# Patient Record
Sex: FEMALE | Race: BLACK OR AFRICAN AMERICAN | NOT HISPANIC OR LATINO | Employment: FULL TIME | URBAN - METROPOLITAN AREA
[De-identification: names, ages, dates, MRNs, and addresses within clinical notes are randomized per-mention and may not be internally consistent; named-entity substitution may affect disease eponyms.]

---

## 2023-05-16 RX ORDER — FLUTICASONE PROPIONATE AND SALMETEROL 500; 50 UG/1; UG/1
POWDER RESPIRATORY (INHALATION) 2 TIMES DAILY
COMMUNITY
Start: 2020-08-26 | End: 2023-05-19 | Stop reason: ALTCHOICE

## 2023-05-16 RX ORDER — FLUTICASONE PROPIONATE 110 UG/1
AEROSOL, METERED RESPIRATORY (INHALATION)
COMMUNITY
Start: 2018-06-11 | End: 2023-05-19 | Stop reason: ALTCHOICE

## 2023-05-16 RX ORDER — ALBUTEROL SULFATE 0.83 MG/ML
SOLUTION RESPIRATORY (INHALATION)
COMMUNITY
Start: 2020-03-18

## 2023-05-16 RX ORDER — ALBUTEROL SULFATE 90 UG/1
AEROSOL, METERED RESPIRATORY (INHALATION)
Qty: 18 G | Status: CANCELLED | OUTPATIENT
Start: 2023-05-16

## 2023-05-16 RX ORDER — ALBUTEROL SULFATE 90 UG/1
AEROSOL, METERED RESPIRATORY (INHALATION)
COMMUNITY
End: 2023-05-19 | Stop reason: SDUPTHER

## 2023-05-19 ENCOUNTER — OFFICE VISIT (OUTPATIENT)
Dept: PRIMARY CARE | Facility: CLINIC | Age: 33
End: 2023-05-19
Payer: COMMERCIAL

## 2023-05-19 ENCOUNTER — LAB (OUTPATIENT)
Dept: LAB | Facility: LAB | Age: 33
End: 2023-05-19
Payer: COMMERCIAL

## 2023-05-19 VITALS
TEMPERATURE: 97.5 F | HEIGHT: 63 IN | DIASTOLIC BLOOD PRESSURE: 78 MMHG | SYSTOLIC BLOOD PRESSURE: 142 MMHG | OXYGEN SATURATION: 96 % | HEART RATE: 79 BPM | WEIGHT: 196 LBS | BODY MASS INDEX: 34.73 KG/M2

## 2023-05-19 DIAGNOSIS — I10 ESSENTIAL HYPERTENSION: ICD-10-CM

## 2023-05-19 DIAGNOSIS — J45.20 MILD INTERMITTENT ASTHMA WITHOUT COMPLICATION (HHS-HCC): ICD-10-CM

## 2023-05-19 DIAGNOSIS — Z76.0 MEDICATION REFILL: Primary | ICD-10-CM

## 2023-05-19 DIAGNOSIS — Z76.0 MEDICATION REFILL: ICD-10-CM

## 2023-05-19 DIAGNOSIS — Z13.1 SCREENING FOR DIABETES MELLITUS (DM): ICD-10-CM

## 2023-05-19 PROBLEM — H52.203 ASTIGMATISM OF BOTH EYES: Status: ACTIVE | Noted: 2023-05-19

## 2023-05-19 PROBLEM — E66.3 OVERWEIGHT: Status: ACTIVE | Noted: 2023-05-19

## 2023-05-19 PROBLEM — F33.9 RECURRENT MAJOR DEPRESSIVE DISORDER (CMS-HCC): Chronic | Status: ACTIVE | Noted: 2021-05-25

## 2023-05-19 PROBLEM — R04.0 NOSEBLEED: Status: RESOLVED | Noted: 2023-05-19 | Resolved: 2023-05-19

## 2023-05-19 PROBLEM — J30.2 OTHER SEASONAL ALLERGIC RHINITIS: Status: ACTIVE | Noted: 2023-05-19

## 2023-05-19 PROBLEM — F32.A DEPRESSION: Status: ACTIVE | Noted: 2021-05-25

## 2023-05-19 PROBLEM — F12.90 MARIJUANA USE: Status: RESOLVED | Noted: 2023-05-19 | Resolved: 2023-05-19

## 2023-05-19 LAB
ALBUMIN (G/DL) IN SER/PLAS: 4 G/DL (ref 3.4–5)
ALBUMIN (MG/L) IN URINE: <7 MG/L
ALBUMIN/CREATININE (UG/MG) IN URINE: NORMAL UG/MG CRT (ref 0–30)
ANION GAP IN SER/PLAS: 10 MMOL/L (ref 10–20)
CALCIUM (MG/DL) IN SER/PLAS: 9.8 MG/DL (ref 8.6–10.6)
CARBON DIOXIDE, TOTAL (MMOL/L) IN SER/PLAS: 28 MMOL/L (ref 21–32)
CHLORIDE (MMOL/L) IN SER/PLAS: 104 MMOL/L (ref 98–107)
CREATININE (MG/DL) IN SER/PLAS: 0.76 MG/DL (ref 0.5–1.05)
CREATININE (MG/DL) IN URINE: 170 MG/DL (ref 20–320)
ESTIMATED AVERAGE GLUCOSE FOR HBA1C: 100 MG/DL
GFR FEMALE: >90 ML/MIN/1.73M2
GLUCOSE (MG/DL) IN SER/PLAS: 80 MG/DL (ref 74–99)
HEMOGLOBIN A1C/HEMOGLOBIN TOTAL IN BLOOD: 5.1 %
PHOSPHATE (MG/DL) IN SER/PLAS: 3.3 MG/DL (ref 2.5–4.9)
POTASSIUM (MMOL/L) IN SER/PLAS: 4.6 MMOL/L (ref 3.5–5.3)
SODIUM (MMOL/L) IN SER/PLAS: 137 MMOL/L (ref 136–145)
UREA NITROGEN (MG/DL) IN SER/PLAS: 14 MG/DL (ref 6–23)

## 2023-05-19 PROCEDURE — 82570 ASSAY OF URINE CREATININE: CPT

## 2023-05-19 PROCEDURE — 36415 COLL VENOUS BLD VENIPUNCTURE: CPT

## 2023-05-19 PROCEDURE — 3078F DIAST BP <80 MM HG: CPT | Performed by: STUDENT IN AN ORGANIZED HEALTH CARE EDUCATION/TRAINING PROGRAM

## 2023-05-19 PROCEDURE — 80069 RENAL FUNCTION PANEL: CPT

## 2023-05-19 PROCEDURE — 82043 UR ALBUMIN QUANTITATIVE: CPT

## 2023-05-19 PROCEDURE — 3077F SYST BP >= 140 MM HG: CPT | Performed by: STUDENT IN AN ORGANIZED HEALTH CARE EDUCATION/TRAINING PROGRAM

## 2023-05-19 PROCEDURE — 99214 OFFICE O/P EST MOD 30 MIN: CPT | Performed by: STUDENT IN AN ORGANIZED HEALTH CARE EDUCATION/TRAINING PROGRAM

## 2023-05-19 PROCEDURE — 83036 HEMOGLOBIN GLYCOSYLATED A1C: CPT

## 2023-05-19 PROCEDURE — 1036F TOBACCO NON-USER: CPT | Performed by: STUDENT IN AN ORGANIZED HEALTH CARE EDUCATION/TRAINING PROGRAM

## 2023-05-19 RX ORDER — MOMETASONE FUROATE AND FORMOTEROL FUMARATE DIHYDRATE 200; 5 UG/1; UG/1
2 AEROSOL RESPIRATORY (INHALATION) 2 TIMES DAILY
Qty: 13 G | Refills: 11 | Status: SHIPPED | OUTPATIENT
Start: 2023-05-19 | End: 2024-06-10

## 2023-05-19 RX ORDER — MONTELUKAST SODIUM 10 MG/1
10 TABLET ORAL DAILY
COMMUNITY
End: 2023-05-19 | Stop reason: SDUPTHER

## 2023-05-19 RX ORDER — ALBUTEROL SULFATE 90 UG/1
2 AEROSOL, METERED RESPIRATORY (INHALATION) EVERY 4 HOURS PRN
Qty: 18 G | Refills: 11 | Status: SHIPPED | OUTPATIENT
Start: 2023-05-19 | End: 2023-05-19 | Stop reason: SDUPTHER

## 2023-05-19 RX ORDER — ALBUTEROL SULFATE 90 UG/1
2 AEROSOL, METERED RESPIRATORY (INHALATION) EVERY 4 HOURS PRN
Qty: 18 G | Refills: 11 | Status: SHIPPED | OUTPATIENT
Start: 2023-05-19 | End: 2023-05-22

## 2023-05-19 RX ORDER — VALACYCLOVIR HYDROCHLORIDE 1 G/1
1000 TABLET, FILM COATED ORAL EVERY 12 HOURS
COMMUNITY

## 2023-05-19 RX ORDER — MOMETASONE FUROATE AND FORMOTEROL FUMARATE DIHYDRATE 200; 5 UG/1; UG/1
2 AEROSOL RESPIRATORY (INHALATION) 2 TIMES DAILY
COMMUNITY
End: 2023-05-19 | Stop reason: SDUPTHER

## 2023-05-19 RX ORDER — IPRATROPIUM BROMIDE AND ALBUTEROL SULFATE 2.5; .5 MG/3ML; MG/3ML
SOLUTION RESPIRATORY (INHALATION)
COMMUNITY
Start: 2020-07-15

## 2023-05-19 RX ORDER — MONTELUKAST SODIUM 10 MG/1
10 TABLET ORAL DAILY
Qty: 90 TABLET | Refills: 3 | Status: SHIPPED | OUTPATIENT
Start: 2023-05-19 | End: 2024-05-18

## 2023-05-19 RX ORDER — VALACYCLOVIR HYDROCHLORIDE 500 MG/1
1 TABLET, FILM COATED ORAL EVERY 24 HOURS
Qty: 30 TABLET | Refills: 45 | COMMUNITY
Start: 2020-03-18 | End: 2023-12-18

## 2023-05-19 RX ORDER — AMLODIPINE BESYLATE 5 MG/1
5 TABLET ORAL DAILY
Qty: 90 TABLET | Refills: 0 | Status: SHIPPED | OUTPATIENT
Start: 2023-05-19 | End: 2023-05-19

## 2023-05-19 ASSESSMENT — PAIN SCALES - GENERAL: PAINLEVEL: 0-NO PAIN

## 2023-05-19 NOTE — TELEPHONE ENCOUNTER
Pt pharmacy called stating that the pt inhaler that you sent over is not cover with her insurance so they just need the brand name Ventolin.

## 2023-05-19 NOTE — PROGRESS NOTES
"Subjective   Patient ID: Suma Lizama is a 32 y.o. female who presents for Med Refill.  #Asthma  - Diagnosed as a child  - Never been hospitalized or had an exacerbation  - Uses rescue inhaler with exercise  - At most will wake up 2x a month for SOB  - Requesting refills of medications  - Denies any CP, SOB, F/C, N/V, or recent illness    #HTN  - No diagnosis of HTN  - Not currently taking medication  - Asymptomatic today  - Denies any HA, back pain, dyspnea/orthopnea, N/V, LH/dizziness      Objective     /78 (BP Location: Right arm, Patient Position: Sitting)   Pulse 79   Temp 36.4 °C (97.5 °F) (Temporal)   Ht 1.6 m (5' 3\")   Wt 88.9 kg (196 lb)   SpO2 96%   BMI 34.72 kg/m²     Physical Exam  Vitals reviewed.   Eyes:      Extraocular Movements: Extraocular movements intact.   Cardiovascular:      Rate and Rhythm: Normal rate and regular rhythm.      Heart sounds: Normal heart sounds.   Pulmonary:      Effort: Pulmonary effort is normal. No respiratory distress.      Breath sounds: Normal breath sounds.   Abdominal:      General: Abdomen is flat. Bowel sounds are normal. There is no distension.      Palpations: Abdomen is soft.      Tenderness: There is no abdominal tenderness. There is no guarding.   Musculoskeletal:         General: Normal range of motion.   Skin:     General: Skin is warm and dry.   Neurological:      General: No focal deficit present.      Mental Status: She is alert and oriented to person, place, and time.       Assessment/Plan   Suma Lizama is a 32 y.o. female who presents for concerns below    Diagnoses and all orders for this visit:  Medication refill  -     Dulera 200-5 mcg/actuation inhaler; Inhale 2 puffs 2 times a day.  -     montelukast (Singulair) 10 mg tablet; Take 1 tablet (10 mg) by mouth once daily.  Mild intermittent asthma without complication  -     Dulera 200-5 mcg/actuation inhaler; Inhale 2 puffs 2 times a day.  -     montelukast (Singulair) 10 mg " tablet; Take 1 tablet (10 mg) by mouth once daily.  Essential hypertension  -     Albumin , Urine Random; Future  -     Renal Function Panel; Future  Screening for diabetes mellitus (DM)  -     Hemoglobin A1c; Future    #Mild diminished asthma  - Chronic, stable  - Medications refilled and sent to patient's pharmacy of choice    #Hypertension  - In office blood pressure elevated, reviewed other blood pressures which are also elevated  - Discussed with patient importance of starting medication, she would defer at this time and would like to pursue weight loss  - We will collect screening labs for further evaluation  - Patient agreeable to reevaluate in 1 month to decide whether to start medications    Patient seen and discussed with attending: Dr.Binder Pantear Soler MD  Family Medicine  PGY3

## 2023-05-19 NOTE — PROGRESS NOTES
I saw and evaluated the patient. I personally obtained the key and critical portions of the history and physical exam or was physically present for key and critical portions performed by the resident/fellow. I reviewed the resident/fellow's documentation and discussed the patient with the resident/fellow. I agree with the resident/fellow's medical decision making as documented in the note.    Shravan Fox MD

## 2023-05-22 ENCOUNTER — TELEPHONE (OUTPATIENT)
Dept: PRIMARY CARE | Facility: CLINIC | Age: 33
End: 2023-05-22
Payer: COMMERCIAL

## 2023-05-22 DIAGNOSIS — Z76.0 MEDICATION REFILL: ICD-10-CM

## 2023-05-22 DIAGNOSIS — J45.20 MILD INTERMITTENT ASTHMA WITHOUT COMPLICATION (HHS-HCC): ICD-10-CM

## 2023-05-22 RX ORDER — AMLODIPINE BESYLATE 5 MG/1
5 TABLET ORAL DAILY
COMMUNITY
Start: 2023-05-19

## 2023-05-22 RX ORDER — ALBUTEROL SULFATE 90 UG/1
2 AEROSOL, METERED RESPIRATORY (INHALATION) EVERY 4 HOURS PRN
Qty: 18 G | Refills: 11 | Status: SHIPPED | OUTPATIENT
Start: 2023-05-22 | End: 2023-06-02 | Stop reason: SDUPTHER

## 2023-05-22 NOTE — TELEPHONE ENCOUNTER
Patient called in stating they need a prescription change for the following prescriptions because her insurance does not cover these  - Albuterol  - Montelukast  - Dulera

## 2023-06-02 DIAGNOSIS — Z76.0 MEDICATION REFILL: ICD-10-CM

## 2023-06-02 DIAGNOSIS — J45.20 MILD INTERMITTENT ASTHMA WITHOUT COMPLICATION (HHS-HCC): ICD-10-CM

## 2023-06-02 RX ORDER — ALBUTEROL SULFATE 90 UG/1
2 AEROSOL, METERED RESPIRATORY (INHALATION) EVERY 4 HOURS PRN
Qty: 18 G | Refills: 0 | Status: SHIPPED | OUTPATIENT
Start: 2023-06-02 | End: 2024-06-10

## 2023-06-20 ENCOUNTER — APPOINTMENT (OUTPATIENT)
Dept: PRIMARY CARE | Facility: CLINIC | Age: 33
End: 2023-06-20
Payer: COMMERCIAL

## 2024-04-26 ENCOUNTER — APPOINTMENT (OUTPATIENT)
Dept: PRIMARY CARE | Facility: CLINIC | Age: 34
End: 2024-04-26
Payer: COMMERCIAL

## 2024-05-28 ENCOUNTER — APPOINTMENT (OUTPATIENT)
Dept: PRIMARY CARE | Facility: CLINIC | Age: 34
End: 2024-05-28
Payer: COMMERCIAL

## 2024-06-06 DIAGNOSIS — J45.20 MILD INTERMITTENT ASTHMA WITHOUT COMPLICATION (HHS-HCC): ICD-10-CM

## 2024-06-06 DIAGNOSIS — Z76.0 MEDICATION REFILL: ICD-10-CM

## 2024-06-10 RX ORDER — MOMETASONE FUROATE AND FORMOTEROL FUMARATE DIHYDRATE 200; 5 UG/1; UG/1
2 AEROSOL RESPIRATORY (INHALATION) 2 TIMES DAILY
Qty: 13 G | Refills: 0 | Status: SHIPPED | OUTPATIENT
Start: 2024-06-10 | End: 2024-06-12 | Stop reason: SDUPTHER

## 2024-06-10 RX ORDER — ALBUTEROL SULFATE 90 UG/1
2 AEROSOL, METERED RESPIRATORY (INHALATION) EVERY 4 HOURS PRN
Qty: 18 G | Refills: 0 | Status: SHIPPED | OUTPATIENT
Start: 2024-06-10 | End: 2024-06-12 | Stop reason: SDUPTHER

## 2024-06-12 DIAGNOSIS — Z76.0 MEDICATION REFILL: ICD-10-CM

## 2024-06-12 DIAGNOSIS — J45.20 MILD INTERMITTENT ASTHMA WITHOUT COMPLICATION (HHS-HCC): ICD-10-CM

## 2024-06-12 RX ORDER — CHLORTHALIDONE 25 MG/1
25 TABLET ORAL DAILY
COMMUNITY
Start: 2024-03-08 | End: 2024-06-12 | Stop reason: SDUPTHER

## 2024-06-12 NOTE — TELEPHONE ENCOUNTER
[facility] called requesting refill of pt chlorthalidone 35mg, Dulera and albuterol inhaler. Noted pt not seen in over a year. Call placed to encourage scheduling. Appt scheduled to establish with Provider Rekha. Orders pended and routed for 30 day supply to last until appt for refills.

## 2024-06-13 RX ORDER — MOMETASONE FUROATE AND FORMOTEROL FUMARATE DIHYDRATE 200; 5 UG/1; UG/1
2 AEROSOL RESPIRATORY (INHALATION) 2 TIMES DAILY
Qty: 13 G | Refills: 0 | Status: SHIPPED | OUTPATIENT
Start: 2024-06-13

## 2024-06-13 RX ORDER — ALBUTEROL SULFATE 90 UG/1
2 AEROSOL, METERED RESPIRATORY (INHALATION) EVERY 4 HOURS PRN
Qty: 18 G | Refills: 0 | Status: SHIPPED | OUTPATIENT
Start: 2024-06-13

## 2024-06-13 RX ORDER — CHLORTHALIDONE 25 MG/1
25 TABLET ORAL DAILY
Qty: 30 TABLET | Refills: 0 | Status: SHIPPED | OUTPATIENT
Start: 2024-06-13 | End: 2024-07-13

## 2024-07-15 ENCOUNTER — APPOINTMENT (OUTPATIENT)
Dept: PRIMARY CARE | Facility: CLINIC | Age: 34
End: 2024-07-15
Payer: COMMERCIAL

## 2024-09-02 DIAGNOSIS — J45.20 MILD INTERMITTENT ASTHMA WITHOUT COMPLICATION (HHS-HCC): ICD-10-CM

## 2024-09-02 DIAGNOSIS — Z76.0 MEDICATION REFILL: ICD-10-CM

## 2024-09-03 RX ORDER — ALBUTEROL SULFATE 90 UG/1
AEROSOL, METERED RESPIRATORY (INHALATION)
Qty: 18 G | Refills: 0 | OUTPATIENT
Start: 2024-09-03

## 2024-09-03 RX ORDER — MOMETASONE FUROATE AND FORMOTEROL FUMARATE DIHYDRATE 200; 5 UG/1; UG/1
AEROSOL RESPIRATORY (INHALATION)
Qty: 13 G | Refills: 0 | OUTPATIENT
Start: 2024-09-03

## 2024-11-17 DIAGNOSIS — J45.20 MILD INTERMITTENT ASTHMA WITHOUT COMPLICATION (HHS-HCC): ICD-10-CM

## 2024-11-17 DIAGNOSIS — Z76.0 MEDICATION REFILL: ICD-10-CM

## 2024-11-25 RX ORDER — ALBUTEROL SULFATE 90 UG/1
AEROSOL, METERED RESPIRATORY (INHALATION)
Qty: 18 G | Refills: 0 | Status: SHIPPED | OUTPATIENT
Start: 2024-11-25

## 2024-11-25 NOTE — TELEPHONE ENCOUNTER
[facility] called requesting refill of pt ventolin inhaler. Noted pt not seen since May 2023,  and in need of transferring care d/t previous provider no longer in clinic. Call placed to encourage scheduling. Appt scheduled for Dec 9th @ 13:00 with Provider True. Pharmacy pended order request routed to provider.

## 2024-12-09 ENCOUNTER — APPOINTMENT (OUTPATIENT)
Dept: PRIMARY CARE | Facility: CLINIC | Age: 34
End: 2024-12-09
Payer: COMMERCIAL

## 2025-02-11 DIAGNOSIS — J45.20 MILD INTERMITTENT ASTHMA WITHOUT COMPLICATION (HHS-HCC): ICD-10-CM

## 2025-02-11 DIAGNOSIS — Z76.0 MEDICATION REFILL: ICD-10-CM

## 2025-02-17 RX ORDER — ALBUTEROL SULFATE 90 UG/1
AEROSOL, METERED RESPIRATORY (INHALATION)
Qty: 18 G | Refills: 0 | Status: SHIPPED | OUTPATIENT
Start: 2025-02-17

## 2025-02-20 ENCOUNTER — HOSPITAL ENCOUNTER (EMERGENCY)
Facility: HOSPITAL | Age: 35
Discharge: LEFT BEFORE DISCHARGE | End: 2025-02-20
Payer: COMMERCIAL

## 2025-02-20 PROCEDURE — 4500999001 HC ED NO CHARGE

## 2025-03-03 ENCOUNTER — APPOINTMENT (OUTPATIENT)
Dept: PRIMARY CARE | Facility: CLINIC | Age: 35
End: 2025-03-03
Payer: COMMERCIAL

## 2025-03-04 ENCOUNTER — OFFICE VISIT (OUTPATIENT)
Dept: PRIMARY CARE | Facility: CLINIC | Age: 35
End: 2025-03-04
Payer: COMMERCIAL

## 2025-03-04 VITALS
HEART RATE: 89 BPM | WEIGHT: 204 LBS | DIASTOLIC BLOOD PRESSURE: 69 MMHG | BODY MASS INDEX: 36.14 KG/M2 | OXYGEN SATURATION: 97 % | SYSTOLIC BLOOD PRESSURE: 139 MMHG | RESPIRATION RATE: 18 BRPM | TEMPERATURE: 97.3 F

## 2025-03-04 DIAGNOSIS — M54.2 NECK PAIN: Primary | ICD-10-CM

## 2025-03-04 PROBLEM — F32.A DEPRESSION: Status: RESOLVED | Noted: 2021-05-25 | Resolved: 2025-03-04

## 2025-03-04 PROCEDURE — 1036F TOBACCO NON-USER: CPT | Performed by: FAMILY MEDICINE

## 2025-03-04 PROCEDURE — 99214 OFFICE O/P EST MOD 30 MIN: CPT | Performed by: FAMILY MEDICINE

## 2025-03-04 RX ORDER — NAPROXEN 500 MG/1
500 TABLET ORAL 2 TIMES DAILY PRN
Qty: 60 TABLET | Refills: 0 | Status: SHIPPED | OUTPATIENT
Start: 2025-03-04 | End: 2025-06-02

## 2025-03-04 RX ORDER — DULOXETIN HYDROCHLORIDE 20 MG/1
20 CAPSULE, DELAYED RELEASE ORAL 2 TIMES DAILY
Qty: 60 CAPSULE | Refills: 0 | Status: SHIPPED | OUTPATIENT
Start: 2025-03-04 | End: 2025-04-03

## 2025-03-04 ASSESSMENT — PAIN SCALES - GENERAL: PAINLEVEL_OUTOF10: 9

## 2025-03-04 NOTE — PROGRESS NOTES
Subjective   Patient ID: Suma Lizama is a 34 y.o. female who presents for Numbness and Acute Visit.    HPI   Neck pain radiates down left hand.  At [facility], diagnosed with cervical radiculopathy : took Prednisone and Tramadol.  Tramadol helped with pain    Review of Systems   All other systems reviewed and are negative.      Objective   /69 (BP Location: Left arm, Patient Position: Sitting, BP Cuff Size: Adult)   Pulse 89   Temp 36.3 °C (97.3 °F) (Temporal)   Resp 18   Wt 92.5 kg (204 lb)   LMP 02/17/2025 (Approximate)   SpO2 97%   BMI 36.14 kg/m²     Physical Exam  Vitals and nursing note reviewed.   Cardiovascular:      Rate and Rhythm: Normal rate and regular rhythm.   Pulmonary:      Effort: Pulmonary effort is normal.      Breath sounds: Normal breath sounds.   Musculoskeletal:      Cervical back: Neck supple. Tenderness present. Decreased range of motion.   Lymphadenopathy:      Cervical: No cervical adenopathy.   Neurological:      Mental Status: She is alert.      Sensory: Sensation is intact.      Motor: Motor function is intact.         Assessment/Plan   Diagnoses and all orders for this visit:  Neck pain  -     naproxen (Naprosyn) 500 mg tablet; Take 1 tablet (500 mg) by mouth 2 times a day as needed for mild pain (1 - 3) (pain).  -     DULoxetine (Cymbalta) 20 mg DR capsule; Take 1 capsule (20 mg) by mouth 2 times a day. Do not crush or chew.  -     Referral to Pain Medicine; Future

## 2025-03-07 ENCOUNTER — OFFICE VISIT (OUTPATIENT)
Dept: PRIMARY CARE | Facility: CLINIC | Age: 35
End: 2025-03-07
Payer: COMMERCIAL

## 2025-03-07 VITALS
TEMPERATURE: 98.1 F | OXYGEN SATURATION: 97 % | SYSTOLIC BLOOD PRESSURE: 153 MMHG | DIASTOLIC BLOOD PRESSURE: 88 MMHG | WEIGHT: 192 LBS | RESPIRATION RATE: 18 BRPM | BODY MASS INDEX: 34.01 KG/M2 | HEART RATE: 86 BPM

## 2025-03-07 DIAGNOSIS — M54.2 NECK PAIN: Primary | ICD-10-CM

## 2025-03-07 DIAGNOSIS — J45.20 MILD INTERMITTENT ASTHMA WITHOUT COMPLICATION (HHS-HCC): ICD-10-CM

## 2025-03-07 PROCEDURE — 99214 OFFICE O/P EST MOD 30 MIN: CPT | Performed by: FAMILY MEDICINE

## 2025-03-07 PROCEDURE — 1036F TOBACCO NON-USER: CPT | Performed by: FAMILY MEDICINE

## 2025-03-07 RX ORDER — ALBUTEROL SULFATE 90 UG/1
2 AEROSOL, METERED RESPIRATORY (INHALATION) EVERY 6 HOURS PRN
Qty: 18 G | Refills: 3 | Status: SHIPPED | OUTPATIENT
Start: 2025-03-07 | End: 2025-04-06

## 2025-03-07 RX ORDER — DICLOFENAC SODIUM 10 MG/G
4 GEL TOPICAL 4 TIMES DAILY
Qty: 100 G | Refills: 1 | Status: SHIPPED | OUTPATIENT
Start: 2025-03-07

## 2025-03-07 RX ORDER — TRAMADOL HYDROCHLORIDE 50 MG/1
50 TABLET ORAL EVERY 6 HOURS PRN
COMMUNITY
Start: 2025-03-04 | End: 2025-03-07

## 2025-03-07 ASSESSMENT — ENCOUNTER SYMPTOMS
SHORTNESS OF BREATH: 0
NAUSEA: 0
FATIGUE: 0
ABDOMINAL PAIN: 0
LIGHT-HEADEDNESS: 0
BOWEL INCONTINENCE: 0
NEUROLOGIC COMPLAINT: 1
DIZZINESS: 0
NECK PAIN: 1
DIAPHORESIS: 0
AURA: 0
HEADACHES: 0
BACK PAIN: 1
CONFUSION: 0
FEVER: 0
VERTIGO: 0
VOMITING: 0
FOCAL WEAKNESS: 1
PALPITATIONS: 0

## 2025-03-07 ASSESSMENT — PATIENT HEALTH QUESTIONNAIRE - PHQ9
1. LITTLE INTEREST OR PLEASURE IN DOING THINGS: NOT AT ALL
2. FEELING DOWN, DEPRESSED OR HOPELESS: NOT AT ALL
SUM OF ALL RESPONSES TO PHQ9 QUESTIONS 1 AND 2: 0

## 2025-03-07 ASSESSMENT — PAIN SCALES - GENERAL: PAINLEVEL_OUTOF10: 8

## 2025-03-07 NOTE — PROGRESS NOTES
Works at [employer], thinking in retrospect . she says neck and shoulder pain are from work. She was lifting heavy and long boxes ( e.g.: treadmill). She was in [facility]  and followed up with Orthopedics for same sharp pain in neck, radiating to left shoulder and left side of chest.   Diagnosed with Cervical neuritis at [facility] and took Prednisone and Tramadol.  Pain still same, she did not return to work given the severity of pain.    She was at [facility] on 3/4 and started on Cymbalata, takes naprosyn 500 mg bid prn and flexeril prn .    Normal neurovascular bundle on left side.  She was referred to pain medicine on 3/4 -she did not call to schedule  She is scheduled with PT starting 3/28       Subjective   Patient ID: Suma Lizama is a 34 y.o. female who presents for ESTABLISHED PATIENT VISIT (Pinched Nerve Pain Left Arm/Side).    Acute Neurological Problem  The patient's primary symptoms include focal weakness. This is a new problem. The current episode started 1 to 4 weeks ago. The neurological problem developed insidiously. The problem has been waxing and waning since onset. There was left-sided and upper extremity focality noted. Associated symptoms include back pain, chest pain and neck pain. Pertinent negatives include no abdominal pain, auditory change, aura, bladder incontinence, bowel incontinence, confusion, diaphoresis, dizziness, fatigue, fever, headaches, light-headedness, nausea, palpitations, shortness of breath, vertigo or vomiting. Past treatments include acetaminophen, aspirin, medication and position change. The treatment provided significant relief.        Review of Systems   Constitutional:  Negative for diaphoresis, fatigue and fever.   Respiratory:  Negative for shortness of breath.    Cardiovascular:  Positive for chest pain. Negative for palpitations.   Gastrointestinal:  Negative for abdominal pain, bowel incontinence, nausea and vomiting.   Genitourinary:  Negative for  bladder incontinence.   Musculoskeletal:  Positive for back pain and neck pain.   Neurological:  Positive for focal weakness. Negative for dizziness, vertigo, light-headedness and headaches.   Psychiatric/Behavioral:  Negative for confusion.        Objective   /88 (BP Location: Left arm, Patient Position: Sitting, BP Cuff Size: Large adult)   Pulse 86   Temp 36.7 °C (98.1 °F) (Temporal)   Resp 18   Wt 87.1 kg (192 lb)   LMP 02/17/2025 (Approximate)   SpO2 97%   BMI 34.01 kg/m²     Physical Exam  Vitals and nursing note reviewed.   Cardiovascular:      Rate and Rhythm: Normal rate and regular rhythm.   Pulmonary:      Effort: Pulmonary effort is normal.      Breath sounds: Normal breath sounds.   Musculoskeletal:      Cervical back: Normal range of motion and neck supple. Muscular tenderness present.   Lymphadenopathy:      Cervical: No cervical adenopathy.   Neurological:      Mental Status: She is alert.      Sensory: Sensation is intact.      Motor: Motor function is intact.   Psychiatric:         Mood and Affect: Affect is labile.         Behavior: Behavior is combative.         Assessment/Plan   Diagnoses and all orders for this visit:  Neck pain  Medication refill  -     Ventolin HFA 90 mcg/actuation inhaler; Inhale 2 puffs every 6 hours if needed for wheezing.  Mild intermittent asthma without complication ([facility]-HCC)  -     Ventolin HFA 90 mcg/actuation inhaler; Inhale 2 puffs every 6 hours if needed for wheezing.

## 2025-03-07 NOTE — LETTER
March 7, 2025     Patient: Suma Lizama   YOB: 1990   Date of Visit: 3/7/2025       To Whom It May Concern:    Suma Lizama was seen in my clinic on 3/7/2025 at 8:45 am. Please excuse Suma for her absence from work until 3/10/2025. Patient to be placed on light duty until patient completes PT/Pain medicine appointments.    If you have any questions or concerns, please don't hesitate to call.         Sincerely,         Jesse Hendrickson MD        CC: No Recipients

## 2025-07-10 ENCOUNTER — OFFICE VISIT (OUTPATIENT)
Dept: PRIMARY CARE | Facility: CLINIC | Age: 35
End: 2025-07-10
Payer: COMMERCIAL

## 2025-07-10 VITALS
OXYGEN SATURATION: 99 % | WEIGHT: 186 LBS | TEMPERATURE: 97.7 F | HEART RATE: 62 BPM | SYSTOLIC BLOOD PRESSURE: 129 MMHG | DIASTOLIC BLOOD PRESSURE: 72 MMHG | BODY MASS INDEX: 32.95 KG/M2

## 2025-07-10 DIAGNOSIS — R01.1 NEWLY RECOGNIZED HEART MURMUR: Primary | ICD-10-CM

## 2025-07-10 DIAGNOSIS — R51.9 INTRACTABLE HEADACHE, UNSPECIFIED CHRONICITY PATTERN, UNSPECIFIED HEADACHE TYPE: ICD-10-CM

## 2025-07-10 DIAGNOSIS — B00.1 HERPES LABIALIS: Primary | ICD-10-CM

## 2025-07-10 DIAGNOSIS — D50.0 IRON DEFICIENCY ANEMIA DUE TO CHRONIC BLOOD LOSS: ICD-10-CM

## 2025-07-10 DIAGNOSIS — J45.20 MILD INTERMITTENT ASTHMA WITHOUT COMPLICATION (HHS-HCC): ICD-10-CM

## 2025-07-10 PROBLEM — M54.2 NECK PAIN: Status: ACTIVE | Noted: 2025-07-10

## 2025-07-10 PROCEDURE — 99214 OFFICE O/P EST MOD 30 MIN: CPT | Performed by: FAMILY MEDICINE

## 2025-07-10 PROCEDURE — 1036F TOBACCO NON-USER: CPT | Performed by: FAMILY MEDICINE

## 2025-07-10 RX ORDER — NAPROXEN 500 MG/1
500 TABLET ORAL 2 TIMES DAILY PRN
Qty: 60 TABLET | Refills: 0 | Status: SHIPPED | OUTPATIENT
Start: 2025-07-10 | End: 2025-10-08

## 2025-07-10 ASSESSMENT — PAIN SCALES - GENERAL: PAINLEVEL_OUTOF10: 0-NO PAIN

## 2025-07-10 NOTE — PROGRESS NOTES
Subjective   Patient ID: Suma Lizama is a 34 y.o. female who presents for Migraine (Headaches(feels like pressure in temple/ dark vision and lost of breath.) and Bleeding/Bruising (Left leg behind knee/ knot on right side of head).    HPI   Headache, blurred: lasted for seconds x 2 weeks    Echymosis left thigh    Review of Systems   All other systems reviewed and are negative.      Objective   /72 (BP Location: Left arm, Patient Position: Sitting, BP Cuff Size: Adult)   Pulse 62   Temp 36.5 °C (97.7 °F) (Temporal)   Wt 84.4 kg (186 lb)   LMP 06/27/2025 (Exact Date)   SpO2 99%   BMI 32.95 kg/m²     Physical Exam  Vitals and nursing note reviewed.   Cardiovascular:      Rate and Rhythm: Normal rate and regular rhythm.      Heart sounds: Murmur heard.   Pulmonary:      Effort: Pulmonary effort is normal.      Breath sounds: Normal breath sounds.   Musculoskeletal:      Cervical back: Neck supple.   Lymphadenopathy:      Cervical: No cervical adenopathy.   Neurological:      Mental Status: She is alert.         Assessment/Plan   Diagnoses and all orders for this visit:  Newly recognized heart murmur  -     Referral to Cardiology; Future  Intractable headache, unspecified chronicity pattern, unspecified headache type  -     Referral to Neurology; Future  -     naproxen (Naprosyn) 500 mg tablet; Take 1 tablet (500 mg) by mouth 2 times a day as needed for mild pain (1 - 3) (pain).  Iron deficiency anemia due to chronic blood loss  -     CBC and Auto Differential; Future  -     Iron and TIBC; Future

## 2025-07-14 ENCOUNTER — HOSPITAL ENCOUNTER (OUTPATIENT)
Dept: CARDIOLOGY | Facility: HOSPITAL | Age: 35
Discharge: HOME | End: 2025-07-14
Payer: COMMERCIAL

## 2025-07-14 ENCOUNTER — OFFICE VISIT (OUTPATIENT)
Dept: CARDIOLOGY | Facility: CLINIC | Age: 35
End: 2025-07-14
Payer: COMMERCIAL

## 2025-07-14 VITALS
BODY MASS INDEX: 32.96 KG/M2 | RESPIRATION RATE: 20 BRPM | DIASTOLIC BLOOD PRESSURE: 85 MMHG | OXYGEN SATURATION: 97 % | HEART RATE: 77 BPM | WEIGHT: 186 LBS | SYSTOLIC BLOOD PRESSURE: 140 MMHG | HEIGHT: 63 IN

## 2025-07-14 DIAGNOSIS — R07.9 CHEST PAIN DUE TO GERD: Primary | ICD-10-CM

## 2025-07-14 DIAGNOSIS — R01.1 NEWLY RECOGNIZED HEART MURMUR: ICD-10-CM

## 2025-07-14 DIAGNOSIS — K21.9 CHEST PAIN DUE TO GERD: ICD-10-CM

## 2025-07-14 DIAGNOSIS — K21.9 CHEST PAIN DUE TO GERD: Primary | ICD-10-CM

## 2025-07-14 DIAGNOSIS — R07.9 CHEST PAIN DUE TO GERD: ICD-10-CM

## 2025-07-14 PROCEDURE — 3008F BODY MASS INDEX DOCD: CPT | Performed by: INTERNAL MEDICINE

## 2025-07-14 PROCEDURE — 93005 ELECTROCARDIOGRAM TRACING: CPT

## 2025-07-14 PROCEDURE — 99202 OFFICE O/P NEW SF 15 MIN: CPT | Performed by: INTERNAL MEDICINE

## 2025-07-14 PROCEDURE — 1036F TOBACCO NON-USER: CPT | Performed by: INTERNAL MEDICINE

## 2025-07-14 PROCEDURE — 93010 ELECTROCARDIOGRAM REPORT: CPT | Performed by: INTERNAL MEDICINE

## 2025-07-14 PROCEDURE — 99205 OFFICE O/P NEW HI 60 MIN: CPT | Performed by: INTERNAL MEDICINE

## 2025-07-14 RX ORDER — VALACYCLOVIR HYDROCHLORIDE 1 G/1
1000 TABLET, FILM COATED ORAL EVERY 12 HOURS
Qty: 6 TABLET | Refills: 0 | Status: SHIPPED | OUTPATIENT
Start: 2025-07-14 | End: 2025-07-17

## 2025-07-14 RX ORDER — PANTOPRAZOLE SODIUM 40 MG/1
40 TABLET, DELAYED RELEASE ORAL
Qty: 90 TABLET | Refills: 3 | Status: SHIPPED | OUTPATIENT
Start: 2025-07-14 | End: 2026-07-14

## 2025-07-14 RX ORDER — ALBUTEROL SULFATE 90 UG/1
2 AEROSOL, METERED RESPIRATORY (INHALATION) EVERY 6 HOURS PRN
Qty: 18 G | Refills: 3 | Status: SHIPPED | OUTPATIENT
Start: 2025-07-14 | End: 2025-08-13

## 2025-07-14 ASSESSMENT — PAIN SCALES - GENERAL: PAINLEVEL_OUTOF10: 0-NO PAIN

## 2025-07-14 NOTE — PROGRESS NOTES
Referred by Dr. Hendrickson for Palpitations     History Of Present Illness:    Suma Lizama is a 34 y.o. female with complex PMH (detailed below) presenting to outpatient cardiology clinic to establish care.  She tells me that her chief complaint today is a burning sensation in her chest worse after certain meals, similar to esophageal reflux disease that she has had in the past.    The patient reports to clinic in usual state of health, tolerating outpatient med regimen without difficulty and has no acute cardiovascular complaints.  Currently denies all red flag cardiovascular symptoms including no exertional chest discomfort or shortness of breath, no symptoms involving the left jaw or arm, no palpitations, presyncope, syncope, or changes in abdominal or lower extremity edema.  She is able to complete all activities of daily living without limitation. Overall cardiovascular status has remained stable, with no changes in clinical condition in the last several weeks / months.    Ms. Lizama currently enjoys a high quality of life, reporting excellent exercise capacity.  Does high intensity cardio in the morning prior to eating breakfast, which she completes with relative ease.  She denies ever having chest symptoms during these periods of exercise regardless of their intensity or duration.    Past Medical History:  Smokes marijuana at least twice daily  BMI 33    Review of Systems   Constitutional:  No Weight Change, No Fever, No Chills, No Night Sweats, No Fatigue, No Malaise   ENT/Mouth:  No Hearing Changes, No Ear Pain, No Nasal Congestion, No  Sinus Pain, No Hoarseness, No sore throat, No Rhinorrhea, No Swallowing  Difficulty   Eyes:  No Eye Pain, No Swelling, No Redness, No Foreign Body, No Discharge, No Vision Changes   Cardiovascular:  See HPI   Respiratory:  No Cough, No Sputum, No Wheezing, No Smoke Exposure, No Dyspnea   Gastrointestinal:  No Nausea, No Vomiting, No Diarrhea, No  Constipation, No Pain,  No Heartburn, No Anorexia, No Dysphagia, No  Hematochezia, No Melena, No Flatulence, No Jaundice   Genitourinary:  No Dysmenorrhea, No DUB, No Dyspareunia, No Dysuria, No  Urinary Frequency, No Hematuria, No Urinary Incontinence, No Urgency,  No Flank Pain, No Urinary Flow Changes   Musculoskeletal:  No Arthralgias, No Myalgias, No Joint Swelling, No  Joint Stiffness, No Back Pain, No Neck Pain, No Injury History   Skin:  No Skin Lesions, No Pruritis, No Hair Changes, No Breast/Skin Changes, No Nipple Discharge   Neuro:  No Weakness, No Numbness, No Paresthesias, No Loss of  Consciousness, No Syncope, No Dizziness, No Headache, No Coordination  Changes, No Recent Falls   Psych:  No Anxiety/Panic, No Depression, No Insomnia, No Delusions, No Rumination, No SI/HI/AH/VH, No Social Issues,  No Memory Changes, No Violence/Abuse Hx., No Eating Concerns   Heme/Lymph:  No Bruising, No Bleeding, No Transfusions History, No Lymphadenopathy   Endocrine:  No Polyuria, No Polydipsia, No Temperature Intolerance        Past Medical History:  She has a past medical history of Acute vaginitis (02/13/2018), Acute vaginitis (02/07/2017), Marijuana use (05/19/2023), Nosebleed (05/19/2023), Other acne (05/15/2007), Other muscle spasm (02/04/2015), Otitis media, unspecified, left ear (02/27/2017), Pain in left shoulder (05/03/2017), Personal history of diseases of the blood and blood-forming organs and certain disorders involving the immune mechanism, Personal history of other diseases of the female genital tract (02/13/2018), Personal history of other diseases of the female genital tract (02/27/2017), Personal history of other diseases of the female genital tract (02/07/2017), Personal history of other diseases of the female genital tract (12/19/2016), Personal history of other diseases of the female genital tract (02/04/2015), Personal history of other diseases of the respiratory system, Personal history of other diseases of the  "respiratory system (02/27/2017), Personal history of other specified conditions (02/27/2017), and Unspecified condition associated with female genital organs and menstrual cycle (12/19/2016).    Past Surgical History:  She has a past surgical history that includes Tonsillectomy (12/19/2016) and Mandible surgery (06/21/2017).      Social History:  She reports that she has never smoked. She has never been exposed to tobacco smoke. She has never used smokeless tobacco. She reports that she does not currently use drugs. She reports that she does not drink alcohol.    Family History:  Family History[1]     Allergies:  Patient has no known allergies.    Outpatient Medications:  Current Outpatient Medications   Medication Instructions    albuterol 2.5 mg /3 mL (0.083 %) nebulizer solution Inhale.    Dulera 200-5 mcg/actuation inhaler 2 puffs, inhalation, 2 times daily    montelukast (SINGULAIR) 10 mg, oral, Daily    naproxen (NAPROSYN) 500 mg, oral, 2 times daily PRN    pantoprazole (PROTONIX) 40 mg, oral, Daily before breakfast, Do not crush, chew, or split.    valACYclovir (VALTREX) 1,000 mg, oral, Every 12 hours    Ventolin HFA 90 mcg/actuation inhaler 2 puffs, inhalation, Every 6 hours PRN        Last Recorded Vitals:  Vitals:    07/14/25 1458   BP: 140/85   BP Location: Left arm   Patient Position: Sitting   BP Cuff Size: Adult   Pulse: 77   Resp: 20   SpO2: 97%   Weight: 84.4 kg (186 lb)   Height: 1.6 m (5' 3\")       Physical Exam:  GEN: calm, cooperative, asking appropriate questions  NEURO: Alert and oriented x3, CN2-12 intact, SILT, Moving all extremities without difficulty  HEENT: atraumatic, no goiter, no JVD, normal uvula   CARDS: PMI is non-displaced, RRR, no MRG  PULM: CTAB, no wheezes / rales / rhonchi   ABD: soft, non-distended, non-tender, non-tympanitic, BS in all 4 quadrants. No hepatosplenomegaly  : unremarkable  SKIN: healthy appearing, normal skin turgor   EXT: atraumatic  VASC: b/l radial pulses " "are brisk and equal            Last Labs:  CBC -  Lab Results   Component Value Date    WBC 5.2 07/15/2020    HGB 12.4 07/15/2020    HCT 38.1 07/15/2020    MCV 96 07/15/2020     07/15/2020       CMP -  Lab Results   Component Value Date    CALCIUM 9.8 05/19/2023    PHOS 3.3 05/19/2023    PROT 6.9 07/15/2020    ALBUMIN 4.0 05/19/2023    AST 18 07/15/2020    ALT 14 07/15/2020    ALKPHOS 59 07/15/2020    BILITOT 0.4 07/15/2020       LIPID PANEL -   Lab Results   Component Value Date    CHOL 139 08/13/2018    HDL 48.7 08/13/2018    CHHDL 2.9 08/13/2018    VLDL 11 08/13/2018    TRIG 54 08/13/2018       RENAL FUNCTION PANEL -   Lab Results   Component Value Date    K 4.6 05/19/2023    PHOS 3.3 05/19/2023       Lab Results   Component Value Date    HGBA1C 5.1 05/19/2023           Last Cardiology Tests:    ECG:  No results found for this or any previous visit (from the past 4464 hours).      Echo:  Echo Results:  No results found for this or any previous visit from the past 365 days.       Ejection Fractions:  No results found for: \"EF\"    Cath:  No results found for this or any previous visit from the past 365 days.        Stress Test:  Stress Results:  No results found for this or any previous visit from the past 365 days.       Cardiac Imaging:  Electrocardiogram 12 Lead  Normal sinus rhythm with sinus arrhythmia  Normal ECG  No previous ECGs available  Confirmed by FILOMENA CHANDRA MD ([id number]) on 1/7/2016 5:14:32 PM  Electrocardiogram 12 Lead  Normal sinus rhythm with sinus arrhythmia  Septal infarct , age undetermined  Abnormal ECG  Confirmed by FILOMENA CHANDRA MD ([id number]) on 1/4/2016 11:00:26 AM      Assessment/Plan   This is a 34-year-old female who reports to clinic to establish care reporting GERD like symptoms which have responded previously to antacids.  From a cardiovascular standpoint, she denies all red flag symptoms including exertional chest discomfort or shortness of breath, no palpitations, presyncope, " syncope, or changes in abdominal or lower extremity edema.  An EKG obtained today in the office is sinus bradycardia with no convincing ST segment changes that would suggest ischemia at rest.  Our plan will be to start daily pantoprazole and have the patient return to clinic in approximately 3 months to assess responsiveness to therapy.  Please see detailed problem based assessment / plan below    # Chest symptoms c/w GERD  - pantoprazole 40 daily  - follow up in 3 months     # Cardiovascular Health Maintenance  - Physical Activity: Encourage 10-15K steps per day  - Healthy Diet: Avoid high fat and high sodium foods (processed meats / fast foods)  --- consider a mediterranean or DASH diet, emphasizing vegetables, fruits, whole grains, lean proteins  - Avoidance of smoking and smoke exposure    Problem List Items Addressed This Visit    None  Visit Diagnoses         Codes      Chest pain due to GERD    -  Primary K21.9, R07.9    Relevant Medications    pantoprazole (Protonix) 40 mg EC tablet    Other Relevant Orders    Follow Up In Cardiology    ECG 12 lead (Ancillary Performed)      Newly recognized heart murmur     R01.1                Time Spent: I spent 40 minutes reviewing medical testing, obtaining medical history and counselling and educating on diagnosis and documenting clinical encounter.   C. Barton Gillombardo, MD  Interventional Cardiology  Pager: [phone]       [1] No family history on file.

## 2025-07-15 LAB
ATRIAL RATE: 58 BPM
P AXIS: 47 DEGREES
P OFFSET: 198 MS
P ONSET: 147 MS
PR INTERVAL: 152 MS
Q ONSET: 223 MS
QRS COUNT: 10 BEATS
QRS DURATION: 68 MS
QT INTERVAL: 410 MS
QTC CALCULATION(BAZETT): 402 MS
QTC FREDERICIA: 405 MS
R AXIS: 51 DEGREES
T AXIS: 43 DEGREES
T OFFSET: 428 MS
VENTRICULAR RATE: 58 BPM

## 2025-07-15 PROCEDURE — 93005 ELECTROCARDIOGRAM TRACING: CPT

## 2025-08-07 DIAGNOSIS — B00.1 HERPES LABIALIS: ICD-10-CM

## 2025-08-08 RX ORDER — VALACYCLOVIR HYDROCHLORIDE 1 G/1
1000 TABLET, FILM COATED ORAL EVERY 12 HOURS
Qty: 6 TABLET | Refills: 0 | Status: SHIPPED | OUTPATIENT
Start: 2025-08-08 | End: 2025-08-11

## 2025-09-02 ENCOUNTER — APPOINTMENT (OUTPATIENT)
Dept: NEUROLOGY | Facility: CLINIC | Age: 35
End: 2025-09-02
Payer: COMMERCIAL

## 2025-09-05 DIAGNOSIS — Z76.0 MEDICATION REFILL: ICD-10-CM

## 2025-09-05 DIAGNOSIS — J45.20 MILD INTERMITTENT ASTHMA WITHOUT COMPLICATION (HHS-HCC): ICD-10-CM

## 2025-09-05 RX ORDER — MOMETASONE FUROATE AND FORMOTEROL FUMARATE DIHYDRATE 200; 5 UG/1; UG/1
AEROSOL RESPIRATORY (INHALATION)
Qty: 13 G | Refills: 0 | Status: SHIPPED | OUTPATIENT
Start: 2025-09-05

## 2025-12-29 ENCOUNTER — APPOINTMENT (OUTPATIENT)
Dept: PRIMARY CARE | Facility: CLINIC | Age: 35
End: 2025-12-29
Payer: COMMERCIAL